# Patient Record
Sex: MALE | Race: OTHER | NOT HISPANIC OR LATINO
[De-identification: names, ages, dates, MRNs, and addresses within clinical notes are randomized per-mention and may not be internally consistent; named-entity substitution may affect disease eponyms.]

---

## 2019-06-12 ENCOUNTER — RESULT REVIEW (OUTPATIENT)
Age: 69
End: 2019-06-12

## 2021-02-17 ENCOUNTER — TRANSCRIPTION ENCOUNTER (OUTPATIENT)
Age: 71
End: 2021-02-17

## 2021-11-17 PROBLEM — Z00.00 ENCOUNTER FOR PREVENTIVE HEALTH EXAMINATION: Status: ACTIVE | Noted: 2021-11-17

## 2021-11-18 ENCOUNTER — APPOINTMENT (OUTPATIENT)
Dept: UROLOGY | Facility: CLINIC | Age: 71
End: 2021-11-18
Payer: MEDICARE

## 2021-11-18 VITALS
DIASTOLIC BLOOD PRESSURE: 85 MMHG | BODY MASS INDEX: 25.9 KG/M2 | WEIGHT: 165 LBS | HEIGHT: 67 IN | OXYGEN SATURATION: 95 % | SYSTOLIC BLOOD PRESSURE: 137 MMHG | HEART RATE: 80 BPM

## 2021-11-18 DIAGNOSIS — Z86.79 PERSONAL HISTORY OF OTHER DISEASES OF THE CIRCULATORY SYSTEM: ICD-10-CM

## 2021-11-18 DIAGNOSIS — Z87.891 PERSONAL HISTORY OF NICOTINE DEPENDENCE: ICD-10-CM

## 2021-11-18 DIAGNOSIS — Z87.39 PERSONAL HISTORY OF OTHER DISEASES OF THE MUSCULOSKELETAL SYSTEM AND CONNECTIVE TISSUE: ICD-10-CM

## 2021-11-18 DIAGNOSIS — Z86.39 PERSONAL HISTORY OF OTHER ENDOCRINE, NUTRITIONAL AND METABOLIC DISEASE: ICD-10-CM

## 2021-11-18 PROCEDURE — 99204 OFFICE O/P NEW MOD 45 MIN: CPT | Mod: 25

## 2021-11-18 PROCEDURE — 51798 US URINE CAPACITY MEASURE: CPT

## 2021-11-19 LAB
APPEARANCE: CLEAR
BACTERIA: NEGATIVE
BILIRUBIN URINE: NEGATIVE
BLOOD URINE: NEGATIVE
COLOR: NORMAL
GLUCOSE QUALITATIVE U: NORMAL
HYALINE CASTS: 0 /LPF
KETONES URINE: NEGATIVE
LEUKOCYTE ESTERASE URINE: NEGATIVE
MICROSCOPIC-UA: NORMAL
NITRITE URINE: NEGATIVE
PH URINE: 6
PROTEIN URINE: NEGATIVE
RED BLOOD CELLS URINE: 9 /HPF
SPECIFIC GRAVITY URINE: 1.02
SQUAMOUS EPITHELIAL CELLS: 0 /HPF
URINE CYTOLOGY: NORMAL
UROBILINOGEN URINE: NORMAL
WHITE BLOOD CELLS URINE: 0 /HPF

## 2021-11-22 PROBLEM — Z86.39 HISTORY OF DIABETES MELLITUS: Status: RESOLVED | Noted: 2021-11-22 | Resolved: 2021-11-22

## 2021-11-22 PROBLEM — Z87.39 HISTORY OF GOUT: Status: RESOLVED | Noted: 2021-11-22 | Resolved: 2021-11-22

## 2021-11-22 PROBLEM — Z86.79 HISTORY OF HYPERTENSION: Status: RESOLVED | Noted: 2021-11-22 | Resolved: 2021-11-22

## 2021-11-22 PROBLEM — Z86.79 HISTORY OF IRREGULAR HEARTBEAT: Status: RESOLVED | Noted: 2021-11-22 | Resolved: 2021-11-22

## 2021-11-22 PROBLEM — Z87.891 FORMER SMOKER: Status: ACTIVE | Noted: 2021-11-22

## 2021-11-22 PROBLEM — Z86.39 HISTORY OF HYPERCHOLESTEROLEMIA: Status: RESOLVED | Noted: 2021-11-22 | Resolved: 2021-11-22

## 2021-11-22 RX ORDER — ALLOPURINOL 100 MG/1
100 TABLET ORAL
Qty: 90 | Refills: 0 | Status: ACTIVE | COMMUNITY
Start: 2021-10-24

## 2021-11-22 RX ORDER — ATORVASTATIN CALCIUM 20 MG/1
20 TABLET, FILM COATED ORAL
Qty: 90 | Refills: 0 | Status: ACTIVE | COMMUNITY
Start: 2021-07-08

## 2021-11-22 RX ORDER — LOSARTAN POTASSIUM AND HYDROCHLOROTHIAZIDE 12.5; 5 MG/1; MG/1
50-12.5 TABLET ORAL
Qty: 90 | Refills: 0 | Status: ACTIVE | COMMUNITY
Start: 2021-10-30

## 2021-11-22 RX ORDER — CARVEDILOL 6.25 MG/1
6.25 TABLET, FILM COATED ORAL
Qty: 180 | Refills: 0 | Status: ACTIVE | COMMUNITY
Start: 2021-07-09

## 2021-11-22 RX ORDER — ATORVASTATIN CALCIUM 40 MG/1
40 TABLET, FILM COATED ORAL
Qty: 30 | Refills: 0 | Status: ACTIVE | COMMUNITY
Start: 2021-11-01

## 2021-11-22 NOTE — LETTER BODY
[Dear  ___] : Dear  [unfilled], [Consult Letter:] : I had the pleasure of evaluating your patient, [unfilled]. [( Thank you for referring [unfilled] for consultation for _____ )] : Thank you for referring [unfilled] for consultation for [unfilled] [Please see my note below.] : Please see my note below. [Consult Closing:] : Thank you very much for allowing me to participate in the care of this patient.  If you have any questions, please do not hesitate to contact me. [Sincerely,] : Sincerely, [FreeTextEntry3] : Loc Cardoza MD\par  of Urology\par [facility]\par \par Offices:\par [facility] @\par [address], [place] [address]\par and\par [address], [place] [address], [address]\par and\par [address] [address]\par \par TEL: [phone]\par FAX: [phone]

## 2021-11-22 NOTE — PHYSICAL EXAM
[Normal Appearance] : normal appearance [General Appearance - In No Acute Distress] : no acute distress [FreeTextEntry1] : normal peripheral circulation  [] : no respiratory distress [Abdomen Soft] : soft [Abdomen Tenderness] : non-tender [Costovertebral Angle Tenderness] : no ~M costovertebral angle tenderness [Urethral Meatus] : meatus normal [Penis Abnormality] : normal circumcised penis [Scrotum] : the scrotum was normal [Epididymis] : the epididymides were normal [Testes Tenderness] : no tenderness of the testes [Testes Mass (___cm)] : there were no testicular masses [Prostate Tenderness] : the prostate was not tender [No Prostate Nodules] : no prostate nodules [Prostate Size ___ (0-4)] : prostate size [unfilled] (scale: 0-4) [Normal Station and Gait] : the gait and station were normal for the patient's age [Skin Color & Pigmentation] : normal skin color and pigmentation [No Focal Deficits] : no focal deficits [Oriented To Time, Place, And Person] : oriented to person, place, and time [No Palpable Adenopathy] : no palpable adenopathy

## 2021-11-22 NOTE — HISTORY OF PRESENT ILLNESS
[FreeTextEntry1] : 71 year old male presents for Microhematuria. \par Recently had urine test and was told has microscopic hematuria. \par Denies gross hematuria, no history of kidney stones. Had urinary tract infection 2 years ago. \par Past smoker. 1 PPD for 20 years. Quit 10 years ago. \par Reports reasonable stream, urinates every 2-3 hours or so during the day. Nocturia of 3 x. \par Denies hesitancy, straining, intermittency, urgency, incontinence, sense of incomplete emptying.\par Denies dysuria, lower abdominal or flank pain, fever, chills or rigors.\par Rates erections as 2-3/5. Reports normal/decreased libido. \par No family history of Prostate cancer. \par \par

## 2021-11-22 NOTE — ASSESSMENT
[FreeTextEntry1] : Reviewed outside records. \par \par \par Benign Prostatic Hyperplasia:\par PVR: 68 ml. \par \par Erectile dysfunction:\par Reviewed pathophysiology and differential diagnosis of erectile dysfunction with the patient. Discussed lifestyle changes. \par The patient was made aware that the current therapies for erectile dysfunction consisting of oral phosphodiesterase type 5 inhibitors, intra-urethral alprostadil, intracavernous vasoactive drug injection, vacuum constriction devices and penile prosthesis implantation. Relative risks and benefits, were discussed. All questions were answered.\par Discussed combined use of Tadalafil 5 mg daily in BPH and ED. \par Explained the common adverse effects of therapy including, but not limited to headache, flushing, upset stomach, nasal congestion, abnormal vision, back pain, and myalgia. Asked pt to stop taking the medicine if he develops chest pain, visual or auditory disturbance. \par Explained priapism- if erection does not go down after ejaculation or lasts more than 4 hrs to present to emergency room. \par \par Microhematuria:\par Discussed the differential diagnosis of hematuria including benign and malignant pathology- including but not limited to nephrolithiasis, bladder stone, urinary tract infection, glomerular disease, renal cancer, bladder cancer, prostate cancer. We also discussed the chance that workup will not reveal a source for the bleeding. The patient understands that the hematuria could be from an upper tract (kidney or ureter) or lower tract (bladder, urethra, prostate) and that workup includes imaging and direct visualization of all of these.\par \par Will proceed with work up with Urinalysis with microscopy, Urine culture, Urine cytology, CT Urogram and Cystoscopy. \par \par Return to office after CT scan: will do Uroflo/PVR. \par

## 2021-11-23 LAB — BACTERIA UR CULT: NORMAL

## 2022-01-06 ENCOUNTER — APPOINTMENT (OUTPATIENT)
Dept: UROLOGY | Facility: CLINIC | Age: 72
End: 2022-01-06
Payer: MEDICARE

## 2022-01-06 VITALS
DIASTOLIC BLOOD PRESSURE: 66 MMHG | HEIGHT: 67 IN | WEIGHT: 168 LBS | SYSTOLIC BLOOD PRESSURE: 103 MMHG | BODY MASS INDEX: 26.37 KG/M2 | OXYGEN SATURATION: 96 % | HEART RATE: 68 BPM

## 2022-01-06 PROCEDURE — 99214 OFFICE O/P EST MOD 30 MIN: CPT | Mod: 25

## 2022-01-06 PROCEDURE — 51798 US URINE CAPACITY MEASURE: CPT

## 2022-01-06 NOTE — HISTORY OF PRESENT ILLNESS
[FreeTextEntry1] : 71 year old male presents for follow up. \par Taking Tadalafil 5 mg daily, urinating better, improved flow, nocturia 2-3 x. Drinks beer in the evening. \par Erections 4/5. \par Did not have CT scan. \par \par Initially seen for Microhematuria. \par Denied gross hematuria, no history of kidney stones. Had urinary tract infection 2 years ago. \par Past smoker. 1 PPD for 20 years. Quit 10 years ago. \par Reported reasonable stream, urinates every 2-3 hours or so during the day. Nocturia of 3 x. \par Denied hesitancy, straining, intermittency, urgency, incontinence, sense of incomplete emptying.\par Denied dysuria, lower abdominal or flank pain, fever, chills or rigors.\par Rated erections as 2-3/5. Reported normal/decreased libido. \par No family history of Prostate cancer. \par \par

## 2022-01-06 NOTE — LETTER BODY
[Dear  ___] : Dear  [unfilled], [Courtesy Letter:] : I had the pleasure of seeing your patient, [unfilled], in my office today. [Please see my note below.] : Please see my note below. [Consult Closing:] : Thank you very much for allowing me to participate in the care of this patient.  If you have any questions, please do not hesitate to contact me. [Sincerely,] : Sincerely, [FreeTextEntry3] : Loc Cardoza MD\par  of Urology\par [facility]\par \par Offices:\par [facility] @\par [address], [place] [address]\par and\par [address], [place] [address], [address]\par and\par [address] [address]\par \par TEL: [phone]\par FAX: [phone]

## 2022-01-06 NOTE — ASSESSMENT
[FreeTextEntry1] : Benign Prostatic Hyperplasia:\par Erectile dysfunction:\par PVR: 25 ml. \par Discussed treatment options.\par Will continue Tadalafil 5 mg daily. \par \par Microhematuria:\par Discussed work up so far.\par Recommended to get CT Urogram and follow up for Cystoscopy. \par \par Return to clinic for next available Cystoscopy.

## 2022-01-14 ENCOUNTER — OUTPATIENT (OUTPATIENT)
Dept: OUTPATIENT SERVICES | Facility: HOSPITAL | Age: 72
LOS: 1 days | End: 2022-01-14
Payer: COMMERCIAL

## 2022-01-14 ENCOUNTER — APPOINTMENT (OUTPATIENT)
Dept: CT IMAGING | Facility: CLINIC | Age: 72
End: 2022-01-14
Payer: MEDICARE

## 2022-01-14 DIAGNOSIS — R31.29 OTHER MICROSCOPIC HEMATURIA: ICD-10-CM

## 2022-01-14 PROCEDURE — 74178 CT ABD&PLV WO CNTR FLWD CNTR: CPT

## 2022-01-14 PROCEDURE — 74178 CT ABD&PLV WO CNTR FLWD CNTR: CPT | Mod: 26

## 2022-01-14 PROCEDURE — 82565 ASSAY OF CREATININE: CPT

## 2022-01-21 ENCOUNTER — TRANSCRIPTION ENCOUNTER (OUTPATIENT)
Age: 72
End: 2022-01-21

## 2022-01-22 ENCOUNTER — OUTPATIENT (OUTPATIENT)
Dept: OUTPATIENT SERVICES | Facility: HOSPITAL | Age: 72
LOS: 1 days | End: 2022-01-22

## 2022-01-22 ENCOUNTER — APPOINTMENT (OUTPATIENT)
Dept: DISASTER EMERGENCY | Facility: HOSPITAL | Age: 72
End: 2022-01-22

## 2022-01-22 VITALS
RESPIRATION RATE: 17 BRPM | SYSTOLIC BLOOD PRESSURE: 126 MMHG | OXYGEN SATURATION: 96 % | TEMPERATURE: 98 F | DIASTOLIC BLOOD PRESSURE: 79 MMHG | HEART RATE: 64 BPM

## 2022-01-22 VITALS
HEIGHT: 67 IN | HEART RATE: 63 BPM | DIASTOLIC BLOOD PRESSURE: 75 MMHG | TEMPERATURE: 98 F | OXYGEN SATURATION: 98 % | SYSTOLIC BLOOD PRESSURE: 124 MMHG | WEIGHT: 164.91 LBS | RESPIRATION RATE: 17 BRPM

## 2022-01-22 DIAGNOSIS — U07.1 COVID-19: ICD-10-CM

## 2022-01-22 RX ORDER — SOTROVIMAB 62.5 MG/ML
500 INJECTION, SOLUTION, CONCENTRATE INTRAVENOUS ONCE
Refills: 0 | Status: COMPLETED | OUTPATIENT
Start: 2022-01-22 | End: 2022-01-22

## 2022-01-22 RX ORDER — SODIUM CHLORIDE 9 MG/ML
250 INJECTION INTRAMUSCULAR; INTRAVENOUS; SUBCUTANEOUS
Refills: 0 | Status: DISCONTINUED | OUTPATIENT
Start: 2022-01-22 | End: 2022-02-05

## 2022-01-22 RX ADMIN — SODIUM CHLORIDE 100 MILLILITER(S): 9 INJECTION INTRAMUSCULAR; INTRAVENOUS; SUBCUTANEOUS at 11:17

## 2022-01-22 RX ADMIN — SOTROVIMAB 116 MILLIGRAM(S): 62.5 INJECTION, SOLUTION, CONCENTRATE INTRAVENOUS at 11:10

## 2022-01-22 NOTE — MONOCLONAL ANTIBODY INFUSION - EXAM
CC: Monoclonal Antibody Infusion/COVID 19 Positive  71yMale with a PMHx of HTN, HLD testing positive for COVID (1/21/22) presenting for MAB infusion.    exam/findings:  T(C): 36.9 (01-22-22 @ 10:58), Max: 36.9 (01-22-22 @ 10:58)  HR: 63 (01-22-22 @ 10:58) (63 - 63)  BP: 124/75 (01-22-22 @ 10:58) (124/75 - 124/75)  RR: 17 (01-22-22 @ 10:58) (17 - 17)  SpO2: 98% (01-22-22 @ 10:58) (98% - 98%)      PE:   Appearance: NAD	  HEENT:   Normal oral mucosa,   Lymphatic: No lymphadenopathy  Cardiovascular: Normal S1 S2, No JVD, No murmurs, No edema  Respiratory: Lungs clear to auscultation	  Gastrointestinal:  Soft, Non-tender, + BS	  Skin: warm and dry  Neurologic: Non-focal  Extremities: Normal range of motion,

## 2022-01-22 NOTE — MONOCLONAL ANTIBODY INFUSION - ASSESSMENT AND PLAN
ASSESSMENT:  Pt is Covid +  referred by urgent care center who presents to infusion center for Monoclonal antibody infusion (Sotrovimab)  Symptoms/ Criteria: fever, chills, cough, nasal congestion, sore throat, body aches  Risk Profile includes: age > 65, HTN, HLD    PLAN:  - infusion procedure explained to patient   -Consent for monoclonal antibody infusion obtained   - Risk & benefits discussed/all questions answered  -infuse  Sotrovimab IV over 30 minutes  -observe patient for one hour post infusion and discharge home ASSESSMENT:  Pt is Covid +  referred by urgent care center who presents to infusion center for Monoclonal antibody infusion (Sotrovimab)  Symptoms/ Criteria: fever, chills, cough, nasal congestion, sore throat, body aches  Risk Profile includes: age > 65, HTN, HLD    Pfizer x2, not boosted     PLAN:  - infusion procedure explained to patient   -Consent for monoclonal antibody infusion obtained   - Risk & benefits discussed/all questions answered  -infuse  Sotrovimab IV over 30 minutes  -observe patient for one hour post infusion and discharge home

## 2022-03-16 ENCOUNTER — APPOINTMENT (OUTPATIENT)
Dept: UROLOGY | Facility: CLINIC | Age: 72
End: 2022-03-16
Payer: MEDICARE

## 2022-03-16 VITALS
BODY MASS INDEX: 25.9 KG/M2 | SYSTOLIC BLOOD PRESSURE: 137 MMHG | OXYGEN SATURATION: 96 % | HEIGHT: 67 IN | HEART RATE: 64 BPM | WEIGHT: 165 LBS | DIASTOLIC BLOOD PRESSURE: 87 MMHG

## 2022-03-16 DIAGNOSIS — R31.29 OTHER MICROSCOPIC HEMATURIA: ICD-10-CM

## 2022-03-16 DIAGNOSIS — N28.1 CYST OF KIDNEY, ACQUIRED: ICD-10-CM

## 2022-03-16 LAB
BILIRUB UR QL STRIP: NORMAL
CLARITY UR: CLEAR
COLLECTION METHOD: NORMAL
GLUCOSE UR-MCNC: NORMAL
HCG UR QL: 0.2 EU/DL
HGB UR QL STRIP.AUTO: NORMAL
KETONES UR-MCNC: NORMAL
LEUKOCYTE ESTERASE UR QL STRIP: NORMAL
NITRITE UR QL STRIP: NORMAL
PH UR STRIP: 6
PROT UR STRIP-MCNC: NORMAL
SP GR UR STRIP: 1.02

## 2022-03-16 PROCEDURE — 99214 OFFICE O/P EST MOD 30 MIN: CPT | Mod: 25

## 2022-03-16 PROCEDURE — 52000 CYSTOURETHROSCOPY: CPT

## 2022-03-16 PROCEDURE — 81003 URINALYSIS AUTO W/O SCOPE: CPT | Mod: QW

## 2022-03-18 ENCOUNTER — RX CHANGE (OUTPATIENT)
Age: 72
End: 2022-03-18

## 2022-03-20 PROBLEM — N28.1 RENAL CYST: Status: ACTIVE | Noted: 2022-03-20

## 2022-03-20 PROBLEM — R31.29 MICROHEMATURIA: Status: ACTIVE | Noted: 2021-11-18

## 2022-03-20 NOTE — HISTORY OF PRESENT ILLNESS
[FreeTextEntry1] : 71 year old male presents for follow up. \par Taking Tadalafil 5 mg daily, urinating well, improved flow, nocturia 2-3 x. Drinks beer in the evening. \par Erections 4/5. \par Had CT scan. \par \par Initially seen for Microhematuria. \par Denied gross hematuria, no history of kidney stones. Had urinary tract infection 2 years ago. \par Past smoker. 1 PPD for 20 years. Quit 10 years ago. \par Reported reasonable stream, urinates every 2-3 hours or so during the day. Nocturia of 3 x. \par Denied hesitancy, straining, intermittency, urgency, incontinence, sense of incomplete emptying.\par Denied dysuria, lower abdominal or flank pain, fever, chills or rigors.\par Rated erections as 2-3/5. Reported normal/decreased libido. \par No family history of Prostate cancer. \par \par

## 2022-03-20 NOTE — LETTER BODY
[Dear  ___] : Dear  [unfilled], [Courtesy Letter:] : I had the pleasure of seeing your patient, [unfilled], in my office today. [Please see my note below.] : Please see my note below. [Sincerely,] : Sincerely, [FreeTextEntry3] : Loc Cardoza MD\par  of Urology\par [facility]\par \par Offices:\par [facility] @\par [address], [place] [address]\par and\par [address], [place] [address], [address]\par and\par [address] [address]\par \par TEL: [phone]\par FAX: [phone]

## 2022-03-20 NOTE — ASSESSMENT
[FreeTextEntry1] : Erectile dysfunction:\par Discussed treatment options. Will continue Tadalafil. \par \par Renal cyst:\par Discussed that Renal cysts are a common finding on routine radiological studies. Autopsy studies in patients over the age of 50 reveal greater than a 50% chance of having at least one simple renal cyst.\par And that renal cysts may be classified as simple or complex depending how they look on imaging. Discussed complexity of renal cysts and its implications as regards malignancy. \par  \par Microhematuria:\par Discussed work up of hematuria so far. Recommended Cystoscopy. Discussed risks and benefits. \par Patient agreeable. Informed consent obtained. \par On Cystoscopy today: \par Bilobar prostatic enlargement with moderately enlarged lateral lobes with coaptation.\par Congestion overlying bladder neck and dilated blood vessels along posterior bladder wall.\par \par Benign Prostatic Hyperplasia:\par Discussed treatment options. Recommended Flomax. Patient agreeable. \par Prescribed Flomax. Explained common side effects: nasal congestion, cough, muscle pain, back pain, dizziness, orthostatic hypotension, somnolence and retrograde ejaculation. \par \par Non genitourinary findings were discussed and asked Patient to discuss them with PCP and respective physicians. \par Patient was recommended to get a copy of the CT scan. \par \par Return to office in 3 months or sooner if any issues: will do Uroflo/PVR

## 2022-04-07 ENCOUNTER — RX CHANGE (OUTPATIENT)
Age: 72
End: 2022-04-07

## 2022-07-21 ENCOUNTER — APPOINTMENT (OUTPATIENT)
Dept: UROLOGY | Facility: CLINIC | Age: 72
End: 2022-07-21

## 2022-08-26 ENCOUNTER — APPOINTMENT (OUTPATIENT)
Dept: UROLOGY | Facility: CLINIC | Age: 72
End: 2022-08-26

## 2022-11-05 ENCOUNTER — RX RENEWAL (OUTPATIENT)
Age: 72
End: 2022-11-05

## 2022-11-10 ENCOUNTER — NON-APPOINTMENT (OUTPATIENT)
Age: 72
End: 2022-11-10

## 2023-01-03 ENCOUNTER — NON-APPOINTMENT (OUTPATIENT)
Age: 73
End: 2023-01-03

## 2023-02-16 ENCOUNTER — NON-APPOINTMENT (OUTPATIENT)
Age: 73
End: 2023-02-16

## 2023-03-16 ENCOUNTER — APPOINTMENT (OUTPATIENT)
Dept: UROLOGY | Facility: CLINIC | Age: 73
End: 2023-03-16
Payer: MEDICARE

## 2023-03-16 VITALS
OXYGEN SATURATION: 97 % | RESPIRATION RATE: 16 BRPM | HEIGHT: 67 IN | DIASTOLIC BLOOD PRESSURE: 82 MMHG | WEIGHT: 165 LBS | HEART RATE: 65 BPM | BODY MASS INDEX: 25.9 KG/M2 | SYSTOLIC BLOOD PRESSURE: 129 MMHG

## 2023-03-16 DIAGNOSIS — R35.1 NOCTURIA: ICD-10-CM

## 2023-03-16 PROCEDURE — 99214 OFFICE O/P EST MOD 30 MIN: CPT | Mod: 25

## 2023-03-16 PROCEDURE — 51741 ELECTRO-UROFLOWMETRY FIRST: CPT

## 2023-03-26 PROBLEM — R35.1 NOCTURIA: Status: ACTIVE | Noted: 2023-03-26

## 2023-03-26 NOTE — LETTER BODY
[Dear  ___] : Dear  [unfilled], [Courtesy Letter:] : I had the pleasure of seeing your patient, [unfilled], in my office today. [Please see my note below.] : Please see my note below. [Sincerely,] : Sincerely, [FreeTextEntry3] : Loc Cardoza MD\par  of Urology\par [facility]\par \par [facility]\par Offices:\par [address] [facility]\par [address] [address]\par [address], [address]\par \par TEL: [phone]\par FAX: [phone]

## 2023-03-26 NOTE — HISTORY OF PRESENT ILLNESS
[FreeTextEntry1] : 72 year old male presents for follow up. \par Taking Flomax, flow better, daytime frequency 3 to 4 hours, nocturia 2 to 3 x. \par Denies hesitancy, straining, intermittency, urgency, incontinence, sense of incomplete emptying. \par Denies dysuria, hematuria, lower abdominal or flank pain, nausea, vomiting, fever, chills or rigors. \par Taking Tadalafil 5 mg daily: good erections. \par \par NOHEMY: Today, 3/16/23. \par \par Status post work up including CT Urogram(1/14/22): renal cysts and Cystoscopy(3/16/22): \par Bilobar prostatic enlargement with moderately enlarged lateral lobes with coaptation.\par Congestion overlying bladder neck and dilated blood vessels along posterior bladder wall.\par \par Initially seen for Microhematuria. \par Denied gross hematuria, no history of kidney stones. Had urinary tract infection 2 years ago. \par Past smoker. 1 PPD for 20 years. Quit 10 years ago. \par Reported reasonable stream, urinates every 2-3 hours or so during the day. Nocturia of 3 x. \par Denied hesitancy, straining, intermittency, urgency, incontinence, sense of incomplete emptying.\par Denied dysuria, lower abdominal or flank pain, fever, chills or rigors.\par Rated erections as 2-3/5. Reported normal/decreased libido. \par No family history of Prostate cancer. \par \par

## 2023-03-26 NOTE — ASSESSMENT
[FreeTextEntry1] : Reviewed outside records on [facility]. \par 1/30/23:\par Urinalysis: RBCs: 3 to 10, WBCs: 10 to 20 Calcium Oxalate crystals in urine\par Creatinine: 1.52\par PSA: 0.83(2/28/22).\par \par Benign Prostatic Hyperplasia:\par See Uroflo and PVR. \par Discussed treatment options, will continue Flomax. \par \par Nocturia:\par Discussed Anti cholinergic or Beta 3 agonist. Patient wants to hold off. \par \par Erectile dysfunction:\par Discussed treatment options, will continue Tadalafil 5 mg daily.\par \par PSA Screening: \par Will do PSA with PCP. \par \par Return to office in 1 year or sooner if any issues: will do Uroflo/PVR.

## 2023-05-16 ENCOUNTER — RX CHANGE (OUTPATIENT)
Age: 73
End: 2023-05-16

## 2024-03-07 ENCOUNTER — APPOINTMENT (OUTPATIENT)
Dept: UROLOGY | Facility: CLINIC | Age: 74
End: 2024-03-07
Payer: MEDICARE

## 2024-03-07 VITALS
SYSTOLIC BLOOD PRESSURE: 99 MMHG | HEIGHT: 67 IN | OXYGEN SATURATION: 95 % | RESPIRATION RATE: 16 BRPM | WEIGHT: 165 LBS | BODY MASS INDEX: 25.9 KG/M2 | HEART RATE: 74 BPM | DIASTOLIC BLOOD PRESSURE: 63 MMHG

## 2024-03-07 DIAGNOSIS — N40.1 BENIGN PROSTATIC HYPERPLASIA WITH LOWER URINARY TRACT SYMPMS: ICD-10-CM

## 2024-03-07 DIAGNOSIS — N52.9 MALE ERECTILE DYSFUNCTION, UNSPECIFIED: ICD-10-CM

## 2024-03-07 DIAGNOSIS — N13.8 BENIGN PROSTATIC HYPERPLASIA WITH LOWER URINARY TRACT SYMPMS: ICD-10-CM

## 2024-03-07 DIAGNOSIS — Z12.5 ENCOUNTER FOR SCREENING FOR MALIGNANT NEOPLASM OF PROSTATE: ICD-10-CM

## 2024-03-07 PROCEDURE — 51741 ELECTRO-UROFLOWMETRY FIRST: CPT

## 2024-03-07 PROCEDURE — 99214 OFFICE O/P EST MOD 30 MIN: CPT | Mod: 25

## 2024-03-07 RX ORDER — TAMSULOSIN HYDROCHLORIDE 0.4 MG/1
0.4 CAPSULE ORAL
Qty: 90 | Refills: 2 | Status: ACTIVE | COMMUNITY
Start: 2022-03-16 | End: 1900-01-01

## 2024-03-07 RX ORDER — TADALAFIL 5 MG/1
5 TABLET ORAL
Qty: 90 | Refills: 1 | Status: ACTIVE | COMMUNITY
Start: 2021-11-18 | End: 1900-01-01

## 2024-03-14 PROBLEM — Z12.5 SCREENING PSA (PROSTATE SPECIFIC ANTIGEN): Status: ACTIVE | Noted: 2023-03-26

## 2024-03-14 PROBLEM — N52.9 MALE ERECTILE DISORDER: Status: ACTIVE | Noted: 2021-11-18

## 2024-03-14 PROBLEM — N40.1 BPH WITH OBSTRUCTION/LOWER URINARY TRACT SYMPTOMS: Status: ACTIVE | Noted: 2021-11-18

## 2024-03-14 NOTE — PHYSICAL EXAM
[Normal Appearance] : normal appearance [General Appearance - In No Acute Distress] : no acute distress [Abdomen Soft] : soft [Abdomen Tenderness] : non-tender [Urethral Meatus] : meatus normal [Epididymis] : the epididymides were normal [Scrotum] : the scrotum was normal [Penis Abnormality] : normal circumcised penis [Testes Tenderness] : no tenderness of the testes [Testes Mass (___cm)] : there were no testicular masses [No Prostate Nodules] : no prostate nodules [Prostate Tenderness] : the prostate was not tender [Prostate Size ___ (0-4)] : prostate size [unfilled] (scale: 0-4) [Oriented To Time, Place, And Person] : oriented to person, place, and time [] : no respiratory distress [Normal Station and Gait] : the gait and station were normal for the patient's age [FreeTextEntry1] : normal peripheral circulation

## 2024-03-14 NOTE — LETTER BODY
[Dear  ___] : Dear  [unfilled], [Courtesy Letter:] : I had the pleasure of seeing your patient, [unfilled], in my office today. [Sincerely,] : Sincerely, [Please see my note below.] : Please see my note below. [FreeTextEntry3] : Loc Cardoza MD\par   of Urology\par  [facility]\par  \par  [facility]\par  Offices:\par  [address] [facility]\par  [address] [address]\par  [address], [address]\par  \par  TEL: [phone]\par  FAX: [phone]

## 2024-03-14 NOTE — ASSESSMENT
[FreeTextEntry1] : Reviewed outside records on [facility].   2/28/22: PSA: 0.83   1/30/23: Urinalysis: RBCs: 3 to 10, WBCs: 10 to 20 Calcium Oxalate crystals in urine  12/29/23: Creatinine: 1.38  Benign Prostatic Hyperplasia: Discussed treatment options, will continue Flomax.   Erectile dysfunction: Discussed treatment options, will continue Tadalafil 5 mg daily.  PSA Screening:  Will do PSA with PCP.   Return to office in 1 year or sooner if any issues: will do Uroflow/PVR.

## 2024-03-14 NOTE — HISTORY OF PRESENT ILLNESS
[FreeTextEntry1] : Primary care physician: Dr Romelia Bashir, [facility].   73-year-old male presents for follow up.  Taking Flomax, flow is better, daytime frequency 3 to 4 hours and nocturia 2 to 3 x.  Denies hesitancy, straining, intermittency, urgency, incontinence, sense of incomplete emptying.  Denies dysuria, hematuria, lower abdominal or flank pain, nausea, vomiting, fever, chills or rigors.  Taking Tadalafil 5 mg daily: good erections.   NOHEMY: Today, 3/7/2024  Status post work up for microhematuria including: CT Urogram (1/14/22): renal cysts  Cystoscopy (3/16/22):  Bi lobar prostatic enlargement with moderately enlarged lateral lobes with coaptation. Congestion overlying bladder neck and dilated blood vessels along posterior bladder wall.  Initially seen for Microhematuria.  Denied gross hematuria, no history of kidney stones. Had urinary tract infection 2 years ago.  Past smoker. 1 PPD for 20 years. Quit 10 years ago.  Reported reasonable stream, urinates every 2-3 hours or so during the day. Nocturia of 3 x.  Denied hesitancy, straining, intermittency, urgency, incontinence, sense of incomplete emptying. Denied dysuria, lower abdominal or flank pain, fever, chills or rigors. Rated erections as 2-3/5. Reported normal/decreased libido.  No family history of Prostate cancer.

## 2024-12-27 ENCOUNTER — EMERGENCY (EMERGENCY)
Facility: HOSPITAL | Age: 74
LOS: 1 days | End: 2024-12-27
Attending: STUDENT IN AN ORGANIZED HEALTH CARE EDUCATION/TRAINING PROGRAM | Admitting: STUDENT IN AN ORGANIZED HEALTH CARE EDUCATION/TRAINING PROGRAM
Payer: MEDICARE

## 2024-12-27 VITALS
RESPIRATION RATE: 16 BRPM | HEART RATE: 131 BPM | WEIGHT: 169.98 LBS | SYSTOLIC BLOOD PRESSURE: 71 MMHG | TEMPERATURE: 98 F | DIASTOLIC BLOOD PRESSURE: 49 MMHG | OXYGEN SATURATION: 100 % | HEIGHT: 67 IN

## 2024-12-27 LAB
ALBUMIN SERPL ELPH-MCNC: 2.7 G/DL — LOW (ref 3.3–5)
ALT FLD-CCNC: 29 U/L — SIGNIFICANT CHANGE UP (ref 12–78)
ANION GAP SERPL CALC-SCNC: 9 MMOL/L — SIGNIFICANT CHANGE UP (ref 5–17)
AST SERPL-CCNC: 26 U/L — SIGNIFICANT CHANGE UP (ref 15–37)
BASOPHILS # BLD AUTO: 0.04 K/UL — SIGNIFICANT CHANGE UP (ref 0–0.2)
BASOPHILS NFR BLD AUTO: 0.3 % — SIGNIFICANT CHANGE UP (ref 0–2)
BUN SERPL-MCNC: 31 MG/DL — HIGH (ref 7–23)
CHLORIDE SERPL-SCNC: 93 MMOL/L — LOW (ref 96–108)
CO2 SERPL-SCNC: 33 MMOL/L — HIGH (ref 22–31)
CREAT SERPL-MCNC: 2.3 MG/DL — HIGH (ref 0.5–1.3)
EGFR: 29 ML/MIN/1.73M2 — LOW
EOSINOPHIL # BLD AUTO: 0.66 K/UL — HIGH (ref 0–0.5)
EOSINOPHIL NFR BLD AUTO: 4.8 % — SIGNIFICANT CHANGE UP (ref 0–6)
GLUCOSE SERPL-MCNC: 302 MG/DL — HIGH (ref 70–99)
HCT VFR BLD CALC: 38.1 % — LOW (ref 39–50)
HGB BLD-MCNC: 12.6 G/DL — LOW (ref 13–17)
IMM GRANULOCYTES NFR BLD AUTO: 1.9 % — HIGH (ref 0–0.9)
LIDOCAIN IGE QN: 58 U/L — SIGNIFICANT CHANGE UP (ref 13–75)
LYMPHOCYTES # BLD AUTO: 18.4 % — SIGNIFICANT CHANGE UP (ref 13–44)
LYMPHOCYTES # BLD AUTO: 2.51 K/UL — SIGNIFICANT CHANGE UP (ref 1–3.3)
MAGNESIUM SERPL-MCNC: 2 MG/DL — SIGNIFICANT CHANGE UP (ref 1.6–2.6)
MCHC RBC-ENTMCNC: 30.3 PG — SIGNIFICANT CHANGE UP (ref 27–34)
MCHC RBC-ENTMCNC: 33.1 G/DL — SIGNIFICANT CHANGE UP (ref 32–36)
MCV RBC AUTO: 91.6 FL — SIGNIFICANT CHANGE UP (ref 80–100)
MONOCYTES # BLD AUTO: 0.78 K/UL — SIGNIFICANT CHANGE UP (ref 0–0.9)
MONOCYTES NFR BLD AUTO: 5.7 % — SIGNIFICANT CHANGE UP (ref 2–14)
NEUTROPHILS # BLD AUTO: 9.38 K/UL — HIGH (ref 1.8–7.4)
NEUTROPHILS NFR BLD AUTO: 68.9 % — SIGNIFICANT CHANGE UP (ref 43–77)
NRBC # BLD: 0 /100 WBCS — SIGNIFICANT CHANGE UP (ref 0–0)
NT-PROBNP SERPL-SCNC: 332 PG/ML — HIGH (ref 0–125)
PLATELET # BLD AUTO: 156 K/UL — SIGNIFICANT CHANGE UP (ref 150–400)
POTASSIUM SERPL-MCNC: 3.7 MMOL/L — SIGNIFICANT CHANGE UP (ref 3.5–5.3)
POTASSIUM SERPL-SCNC: 3.7 MMOL/L — SIGNIFICANT CHANGE UP (ref 3.5–5.3)
RBC # BLD: 4.16 M/UL — LOW (ref 4.2–5.8)
RBC # FLD: 12.5 % — SIGNIFICANT CHANGE UP (ref 10.3–14.5)
SODIUM SERPL-SCNC: 135 MMOL/L — SIGNIFICANT CHANGE UP (ref 135–145)
TROPONIN I, HIGH SENSITIVITY RESULT: 78.1 NG/L — SIGNIFICANT CHANGE UP
WBC # BLD: 13.63 K/UL — HIGH (ref 3.8–10.5)
WBC # FLD AUTO: 13.63 K/UL — HIGH (ref 3.8–10.5)

## 2024-12-27 PROCEDURE — 99053 MED SERV 10PM-8AM 24 HR FAC: CPT

## 2024-12-27 PROCEDURE — 92950 HEART/LUNG RESUSCITATION CPR: CPT

## 2024-12-27 PROCEDURE — 99285 EMERGENCY DEPT VISIT HI MDM: CPT | Mod: 25

## 2024-12-27 PROCEDURE — 93010 ELECTROCARDIOGRAM REPORT: CPT

## 2024-12-27 PROCEDURE — 99284 EMERGENCY DEPT VISIT MOD MDM: CPT | Mod: 25

## 2024-12-27 RX ORDER — SODIUM CHLORIDE 9 MG/ML
1400 INJECTION, SOLUTION INTRAMUSCULAR; INTRAVENOUS; SUBCUTANEOUS ONCE
Refills: 0 | Status: DISCONTINUED | OUTPATIENT
Start: 2024-12-27 | End: 2024-12-31

## 2024-12-27 RX ORDER — PIPERACILLIN SODIUM AND TAZOBACTAM SODIUM 4; .5 G/20ML; G/20ML
3.38 INJECTION, POWDER, LYOPHILIZED, FOR SOLUTION INTRAVENOUS ONCE
Refills: 0 | Status: DISCONTINUED | OUTPATIENT
Start: 2024-12-27 | End: 2024-12-31

## 2024-12-27 RX ORDER — SODIUM CHLORIDE 9 MG/ML
1000 INJECTION, SOLUTION INTRAMUSCULAR; INTRAVENOUS; SUBCUTANEOUS ONCE
Refills: 0 | Status: DISCONTINUED | OUTPATIENT
Start: 2024-12-27 | End: 2024-12-31

## 2024-12-27 NOTE — ED PROVIDER NOTE - WET READ LAUNCH FT
There are no Wet Read(s) to document. Pupils equal, round and reactive to light, Extra-ocular movement intact, eyes are clear b/l There is 1 Wet Read(s) to document.

## 2024-12-27 NOTE — ED PROVIDER NOTE - PROGRESS NOTE DETAILS
patient in CT became unresponsive without a pulse. CPR immediately started. brought into trauma room. patient on CT chest with large thoracic aortic appears ruptured. CC team at bedside. MTP called. numerous epis given. chest tube placed on the R by CC team To try to relieve hemothorax, 1 L of blood put out.. intubated by respiratory.  CPR was continued, discussed with the patient's wife and daughter about futility of actions at this point.  Family brought into the room to see patient.  pulse check, there was no pulse, no cardiac motion on echo, PEA on monitor. Time of death was called at 0024 discussed with ME, Skyler Trevino, will not be taking case. patient in CT became unresponsive without a pulse. CPR immediately started. brought into trauma room. patient on CT chest with large thoracic aortic appears ruptured. CC team at bedside. MTP called however unable to get blood products from blood bank despite multiple attempts. numerous epis given. chest tube placed on the R by CC team To try to relieve hemothorax, 1 L of blood put out.. intubated by respiratory.  CPR was continued, discussed with the patient's wife and daughter about futility of actions at this point.  Family brought into the room to see patient.  pulse check, there was no pulse, no cardiac motion on echo, PEA on monitor. Time of death was called at 0024 patient in CT became unresponsive without a pulse. CPR immediately started. brought into trauma room. patient on CT chest with large thoracic aorta appears ruptured, hemothorax in both R and L pleural space. CC team at bedside. MTP called however unable to get blood products from blood bank despite multiple attempts. Patient in mostly PEA with 2 episodes of VF which were shocked. numerous epis given. chest tube placed on the R by CC team To try to relieve hemothorax, about 1 L of blood put out.. intubated by respiratory.  CPR was continued, discussed with the patient's wife and daughter about futility of actions at this point.  Family brought into the room to see patient.  pulse was checked, there was no pulse, no cardiac motion on echo, PEA on monitor. Time of death was called at 0024

## 2024-12-27 NOTE — ED PROVIDER NOTE - CRITICAL CARE ATTENDING CONTRIBUTION TO CARE
I personally spent 35 mins of critical care time managing this patient excluding procedures, including time spent independently reviewing charts, labs, imaging, discussing with consultants and family members given patient's hypotension, ams

## 2024-12-27 NOTE — ED ADULT TRIAGE NOTE - CHIEF COMPLAINT QUOTE
74 yr old male arrives to ED via ems c/o 8/10 chest pain, pt notes to be hypotensive, on nonrebreather. Sara RODRIGUES

## 2024-12-27 NOTE — ED PROVIDER NOTE - PHYSICAL EXAMINATION
General: ill appearing, diaphoretic,  acute distress, appropriate weight. no external signs of trauma.   HENT:  Head: normocephalic, atraumatic.   Ears: canal clear, TM intact with good light reflex  Eyes: EOMI, PERRLA, no nystagmus  Nose: atraumatic  Oropharynx: mucosa pink, moist, no lesions, uvula midline.  Neck: no lymphadenopathy    Cardiac: heart regular rate, irregular rhythm. no murmurs, rubs, gallops, pulses 2+ x4  Pulm: lungs CTA  GI: abdomen soft, nontender,  Neuro: A&Ox0 - moving all extremities. unable to further excess.   Skin: warm, diaphoretic. no rash, laceration, abrasion, contusion

## 2024-12-27 NOTE — ED PROVIDER NOTE - CLINICAL SUMMARY MEDICAL DECISION MAKING FREE TEXT BOX
Patient is a 74-year-old male history of hypertension, hyperlipidemia, diabetes who presents emergency department for altered mental status.  Patient according to his family was complaining of epigastric pain as well as chest pain prior to arrival, patient then tried to get up and had syncopal episode for about 5 to 6 minutes, patient then vomited several times nonbilious nonbloody.  Patient has been confused since then.  On EMS arrival, patient was hypotensive to the 80s.  On arrival to the emergency department patient continues to be hypotensive, EKG without STEMI, showing prolonged NC, PVCs.  Patient hypotensive to 80 systolic, improving after fluids to the 100s systolic.  Patient temperature normal.  Fingerstick within normal limits.  After BP improved, patient sent expeditiously to CT to rule out dissection.    will obtain labs, imaging and reeval

## 2024-12-28 VITALS — OXYGEN SATURATION: 86 %

## 2024-12-28 LAB
ALP SERPL-CCNC: 66 U/L — SIGNIFICANT CHANGE UP (ref 40–120)
APTT BLD: 19 SEC — LOW (ref 24.5–35.6)
BILIRUB SERPL-MCNC: 0.7 MG/DL — SIGNIFICANT CHANGE UP (ref 0.2–1.2)
CALCIUM SERPL-MCNC: 13.9 MG/DL — CRITICAL HIGH (ref 8.5–10.1)
INR BLD: 1.1 RATIO — SIGNIFICANT CHANGE UP (ref 0.85–1.16)
LACTATE SERPL-SCNC: 3.4 MMOL/L — HIGH (ref 0.7–2)
PROT SERPL-MCNC: 6.8 G/DL — SIGNIFICANT CHANGE UP (ref 6–8.3)
PROTHROM AB SERPL-ACNC: 12.8 SEC — SIGNIFICANT CHANGE UP (ref 9.9–13.4)

## 2024-12-28 PROCEDURE — 92950 HEART/LUNG RESUSCITATION CPR: CPT

## 2024-12-28 PROCEDURE — 71275 CT ANGIOGRAPHY CHEST: CPT | Mod: MC

## 2024-12-28 PROCEDURE — 72125 CT NECK SPINE W/O DYE: CPT | Mod: MC

## 2024-12-28 PROCEDURE — 85610 PROTHROMBIN TIME: CPT

## 2024-12-28 PROCEDURE — 71045 X-RAY EXAM CHEST 1 VIEW: CPT

## 2024-12-28 PROCEDURE — 70450 CT HEAD/BRAIN W/O DYE: CPT | Mod: MC

## 2024-12-28 PROCEDURE — 85025 COMPLETE CBC W/AUTO DIFF WBC: CPT

## 2024-12-28 PROCEDURE — 93005 ELECTROCARDIOGRAM TRACING: CPT

## 2024-12-28 PROCEDURE — 83880 ASSAY OF NATRIURETIC PEPTIDE: CPT

## 2024-12-28 PROCEDURE — 71045 X-RAY EXAM CHEST 1 VIEW: CPT | Mod: 26

## 2024-12-28 PROCEDURE — 85730 THROMBOPLASTIN TIME PARTIAL: CPT

## 2024-12-28 PROCEDURE — 72125 CT NECK SPINE W/O DYE: CPT | Mod: 26,MC

## 2024-12-28 PROCEDURE — 74174 CTA ABD&PLVS W/CONTRAST: CPT | Mod: 26,MC

## 2024-12-28 PROCEDURE — 36415 COLL VENOUS BLD VENIPUNCTURE: CPT

## 2024-12-28 PROCEDURE — 80053 COMPREHEN METABOLIC PANEL: CPT

## 2024-12-28 PROCEDURE — 99291 CRITICAL CARE FIRST HOUR: CPT | Mod: 25

## 2024-12-28 PROCEDURE — 84484 ASSAY OF TROPONIN QUANT: CPT

## 2024-12-28 PROCEDURE — 74174 CTA ABD&PLVS W/CONTRAST: CPT | Mod: MC

## 2024-12-28 PROCEDURE — 83690 ASSAY OF LIPASE: CPT

## 2024-12-28 PROCEDURE — 83735 ASSAY OF MAGNESIUM: CPT

## 2024-12-28 PROCEDURE — 32551 INSERTION OF CHEST TUBE: CPT

## 2024-12-28 PROCEDURE — 83605 ASSAY OF LACTIC ACID: CPT

## 2024-12-28 PROCEDURE — C1729: CPT

## 2024-12-28 PROCEDURE — 70450 CT HEAD/BRAIN W/O DYE: CPT | Mod: 26,MC

## 2024-12-28 PROCEDURE — 71275 CT ANGIOGRAPHY CHEST: CPT | Mod: 26,MC

## 2024-12-28 PROCEDURE — 82962 GLUCOSE BLOOD TEST: CPT

## 2024-12-28 NOTE — ED ADULT NURSE NOTE - NSFALLRISKINTERV_ED_ALL_ED
Assistance OOB with selected safe patient handling equipment if applicable/Assistance with ambulation/Communicate fall risk and risk factors to all staff, patient, and family/Monitor gait and stability/Monitor for mental status changes and reorient to person, place, and time, as needed/Provide visual cue: yellow wristband, yellow gown, etc/Reinforce activity limits and safety measures with patient and family/Toileting schedule using arm’s reach rule for commode and bathroom/Use of alarms - bed, stretcher, chair and/or video monitoring/Call bell, personal items and telephone in reach/Instruct patient to call for assistance before getting out of bed/chair/stretcher/Non-slip footwear applied when patient is off stretcher/[place] to call system/Physically safe environment - no spills, clutter or unnecessary equipment/Purposeful Proactive Rounding/Room/bathroom lighting operational, light cord in reach

## 2024-12-28 NOTE — ED ADULT NURSE NOTE - OBJECTIVE STATEMENT
Pt presents with weakness and lethargy, minimally responsive to painful stimulation, EKG, FS performed. Pt hypotensive and tachycardic, GCS 11. No trauma per family. pt complained of back pain per family

## 2024-12-28 NOTE — PROCEDURE NOTE - ADDITIONAL PROCEDURE DETAILS
Responded to ER to assist with code blue. Patient with ruptured thoracic aneurysm.     Ultrasound demonstrates large right hemothorax. 10 blade scalpel used for skin incision, blunt dissected down to pleura. Entered pleural space with clamps. Finger sweep with no adhesions. Large amount of blood immediately evacuated from pleural space upon access. 36FR chest tube inserted, blood draining into tube and outside tube. Connected to pleurevac.     Sutured and taped in place.    Performed independent of critical care time.     Dx:  Cardiac arrest  Hemothorax  Thoracic aneurysm with rupture

## 2024-12-28 NOTE — CHART NOTE - NSCHARTNOTEFT_GEN_A_CORE
Responded to ER to assist with code blue. Patient with ruptured thoracic aneurysm.     Assisted with compressions. Defibrillated patient multiple times, see code report.   Inserted chest tube.     Patient  in ED.     Critical Care Time (EXCLUSIVE of any non bundled procedures) :  40 minutes were spent assessing the patient's presenting problems of acute illness that pose a high probability of life threatening  deterioration or end organ damage / dysfunction.  Medical decision making includes initiation / continuation of plan or care review data/ labwork/ radiographic study, direct patient care at bedside, discussions with consultants regarding care, evaluation and interpretation of vital signs, any necessary ventilator management, NIV or BIPAP changes or initiation, discussions with multidisciplinary team,  am or pm rounds, discussions of goals of care with patient and family, all non-inclusive of procedures.    Date of entry of this note is equal to the date of services rendered

## 2025-01-04 NOTE — ED PROVIDER NOTE - INTERNATIONAL TRAVEL
EKG - see results section for interpretation/Xray Image(s) - see wet read section for interpretation
No

## 2025-03-06 ENCOUNTER — APPOINTMENT (OUTPATIENT)
Dept: UROLOGY | Facility: CLINIC | Age: 75
End: 2025-03-06